# Patient Record
Sex: FEMALE | NOT HISPANIC OR LATINO | URBAN - METROPOLITAN AREA
[De-identification: names, ages, dates, MRNs, and addresses within clinical notes are randomized per-mention and may not be internally consistent; named-entity substitution may affect disease eponyms.]

---

## 2017-12-15 ENCOUNTER — OFFICE VISIT (OUTPATIENT)
Dept: FAMILY MEDICINE CLINIC | Facility: CLINIC | Age: 27
End: 2017-12-15

## 2017-12-15 VITALS
WEIGHT: 196.1 LBS | DIASTOLIC BLOOD PRESSURE: 70 MMHG | OXYGEN SATURATION: 100 % | TEMPERATURE: 98 F | RESPIRATION RATE: 18 BRPM | HEART RATE: 78 BPM | SYSTOLIC BLOOD PRESSURE: 118 MMHG

## 2017-12-15 DIAGNOSIS — R55 SYNCOPE, UNSPECIFIED SYNCOPE TYPE: ICD-10-CM

## 2017-12-15 DIAGNOSIS — H02.823 EYELID CYST, RIGHT: ICD-10-CM

## 2017-12-15 DIAGNOSIS — Z00.00 WELL ADULT EXAM: Primary | ICD-10-CM

## 2017-12-15 PROCEDURE — 99212 OFFICE O/P EST SF 10 MIN: CPT | Performed by: INTERNAL MEDICINE

## 2017-12-15 PROCEDURE — 71020 CHG CHEST X-RAY 2 VW: CPT | Performed by: INTERNAL MEDICINE

## 2017-12-15 PROCEDURE — 99385 PREV VISIT NEW AGE 18-39: CPT | Performed by: INTERNAL MEDICINE

## 2017-12-15 PROCEDURE — 93000 ELECTROCARDIOGRAM COMPLETE: CPT | Performed by: INTERNAL MEDICINE

## 2017-12-15 NOTE — PROGRESS NOTES
"Subjective   Marbella Montilla is a 27 y.o. female who comes in today for   Chief Complaint   Patient presents with   • Annual Exam   • NEW PATIENT   .    History of Present Illness   Here for CPE as a new patient.  Is a friend of Valeri Whitt.  Has a 10 year old son..  Is a  and travels a lot.  In 2015 she was working out with a  and had a 1 episode of syncope and another of near syncope both while exercising and performing a movement related to valsalva.  Was doing walking squats when she passed out first time.  Had done 40 squats and then got nauseated and light headed and stomach pain.  She sat down and put her her hands above head and then she passed out and awoke with him staring at her.  Had another similar episode was during a bootcamp and was burpees and felt similar sx's and she stopped herself and prevented a black out.  A few months ago while doing weights she felt lightheaded and nausea and stopped again and no black out.  She had drank and eaten that day.  No cp.  No palpitations.  Grandma has a pacemaker.  Mom has similar abnl rhythm but no pacemaker yet and has SLE.  Blood pressure always normal.  Walks about 4 miles/day and no sx's of syncope or presyncope.  No orthostatic sx's or dizziness.  Mom with SLE.  Has right upper eyelid cyst that fills with fluid and has drained at times.  No visual changes  The following portions of the patient's history were reviewed and updated as appropriate: allergies, current medications, past family history, past medical history, past social history, past surgical history and problem list.    Review of Systems   Constitutional: Negative.    HENT: Negative.         She snores at night and has noticed it for past few years   Eyes: Negative.         Right eyelid upper cyst on it that has been there for a  year   Respiratory:        For years she has had random breathing differences.  Mom called it her \"quirks\".  Thinks it is in her head.   "   Cardiovascular: Negative for chest pain, palpitations and leg swelling.   Gastrointestinal: Negative.  Negative for constipation.   Genitourinary:        Cycles are regular   Musculoskeletal: Negative.    Neurological: Positive for syncope.   Psychiatric/Behavioral:        Situational anxiety at times.       Vitals:    12/15/17 1211   BP: 118/70   Pulse: 78   Resp: 18   Temp: 98 °F (36.7 °C)   SpO2: 100%       Objective   Physical Exam   Constitutional: She is oriented to person, place, and time. She appears well-developed and well-nourished.   HENT:   Head: Normocephalic and atraumatic.   Right Ear: External ear normal.   Left Ear: External ear normal.   Mouth/Throat: Oropharynx is clear and moist.   Eyes: Conjunctivae and EOM are normal. Pupils are equal, round, and reactive to light.   Right upper lid with linear irregular cyst that is mostly flat and not inflamed   Neck: Neck supple. No thyromegaly present.   Cardiovascular: Normal rate, regular rhythm, normal heart sounds and intact distal pulses.  Exam reveals no gallop and no friction rub.    No murmur heard.  No murmur with squatting and standing   Pulmonary/Chest: Effort normal and breath sounds normal.   Abdominal: Soft. Bowel sounds are normal. She exhibits no distension and no mass. There is no tenderness.   Lymphadenopathy:     She has no cervical adenopathy.   Neurological: She is alert and oriented to person, place, and time. She has normal reflexes.   Skin: Skin is warm.   Psychiatric: She has a normal mood and affect. Her behavior is normal. Judgment and thought content normal.   Nursing note and vitals reviewed.      Assessment/Plan   Marbella was seen today for annual exam and new patient.    Diagnoses and all orders for this visit:    Well adult exam  -     ECG 12 Lead  -     CBC & Differential  -     Comprehensive Metabolic Panel  -     Lipid Panel With LDL / HDL Ratio  -     T3, Free  -     T4, Free  -     TSH  -     Urinalysis With / Culture  If Indicated - Urine, Clean Catch  -     Vitamin D 25 Hydroxy    Syncope, unspecified syncope type  -     Ambulatory Referral to Cardiology  -     CBC & Differential  -     Comprehensive Metabolic Panel  -     Lipid Panel With LDL / HDL Ratio  -     T3, Free  -     T4, Free  -     TSH  -     Urinalysis With / Culture If Indicated - Urine, Clean Catch  -     Vitamin D 25 Hydroxy    Other orders  -     SCANNED EKG    EKG is normal.  Concerned about hypertrophic cardiomyopathy or arrhythmia, however, her exam is totally normal.  No murmur with valsalva heard and EKG normal.  Carotid disease is another concern.  Will have her see cardiology as soon as possible and in the meantime no lifting weights or doing any kind of exercise with bearing down or holding breath.   Walking is fine.  Labs today.  Refer to Dr. Valdovinos for her eye lid cyst and removal.                 I have asked for the patient to return to clinic in 6month(s).

## 2017-12-15 NOTE — PROGRESS NOTES
Procedure     ECG 12 Lead  Date/Time: 12/15/2017 1:03 PM  Performed by: LUAN ORDAZ  Authorized by: LUAN ORDAZ   Comparison: not compared with previous ECG   Rhythm: sinus bradycardia  Rate: normal  Conduction: conduction normal  ST Segments: ST segments normal  T Waves: T waves normal  QRS axis: normal  Clinical impression: normal ECG

## 2017-12-16 LAB
25(OH)D3+25(OH)D2 SERPL-MCNC: 11.2 NG/ML (ref 30–100)
ALBUMIN SERPL-MCNC: 4.4 G/DL (ref 3.5–5.2)
ALBUMIN/GLOB SERPL: 1.4 G/DL
ALP SERPL-CCNC: 80 U/L (ref 39–117)
ALT SERPL-CCNC: 14 U/L (ref 1–33)
AST SERPL-CCNC: 16 U/L (ref 1–32)
BASOPHILS # BLD AUTO: 0.02 10*3/MM3 (ref 0–0.2)
BASOPHILS NFR BLD AUTO: 0.3 % (ref 0–1.5)
BILIRUB SERPL-MCNC: 0.4 MG/DL (ref 0.1–1.2)
BUN SERPL-MCNC: 9 MG/DL (ref 6–20)
BUN/CREAT SERPL: 12.2 (ref 7–25)
CALCIUM SERPL-MCNC: 9.4 MG/DL (ref 8.6–10.5)
CHLORIDE SERPL-SCNC: 102 MMOL/L (ref 98–107)
CHOLEST SERPL-MCNC: 174 MG/DL (ref 0–200)
CO2 SERPL-SCNC: 23.9 MMOL/L (ref 22–29)
CREAT SERPL-MCNC: 0.74 MG/DL (ref 0.57–1)
EOSINOPHIL # BLD AUTO: 0.13 10*3/MM3 (ref 0–0.7)
EOSINOPHIL NFR BLD AUTO: 2 % (ref 0.3–6.2)
ERYTHROCYTE [DISTWIDTH] IN BLOOD BY AUTOMATED COUNT: 13.6 % (ref 11.7–13)
GFR SERPLBLD CREATININE-BSD FMLA CKD-EPI: 113 ML/MIN/1.73
GFR SERPLBLD CREATININE-BSD FMLA CKD-EPI: 93 ML/MIN/1.73
GLOBULIN SER CALC-MCNC: 3.2 GM/DL
GLUCOSE SERPL-MCNC: 80 MG/DL (ref 65–99)
HCT VFR BLD AUTO: 41.2 % (ref 35.6–45.5)
HDLC SERPL-MCNC: 57 MG/DL (ref 40–60)
HGB BLD-MCNC: 13.2 G/DL (ref 11.9–15.5)
IMM GRANULOCYTES # BLD: 0 10*3/MM3 (ref 0–0.03)
IMM GRANULOCYTES NFR BLD: 0 % (ref 0–0.5)
LDLC SERPL CALC-MCNC: 100 MG/DL (ref 0–100)
LDLC/HDLC SERPL: 1.75 {RATIO}
LYMPHOCYTES # BLD AUTO: 2.6 10*3/MM3 (ref 0.9–4.8)
LYMPHOCYTES NFR BLD AUTO: 40.3 % (ref 19.6–45.3)
MCH RBC QN AUTO: 29.7 PG (ref 26.9–32)
MCHC RBC AUTO-ENTMCNC: 32 G/DL (ref 32.4–36.3)
MCV RBC AUTO: 92.6 FL (ref 80.5–98.2)
MONOCYTES # BLD AUTO: 0.54 10*3/MM3 (ref 0.2–1.2)
MONOCYTES NFR BLD AUTO: 8.4 % (ref 5–12)
NEUTROPHILS # BLD AUTO: 3.16 10*3/MM3 (ref 1.9–8.1)
NEUTROPHILS NFR BLD AUTO: 49 % (ref 42.7–76)
PLATELET # BLD AUTO: 365 10*3/MM3 (ref 140–500)
POTASSIUM SERPL-SCNC: 3.9 MMOL/L (ref 3.5–5.2)
PROT SERPL-MCNC: 7.6 G/DL (ref 6–8.5)
RBC # BLD AUTO: 4.45 10*6/MM3 (ref 3.9–5.2)
SODIUM SERPL-SCNC: 141 MMOL/L (ref 136–145)
T3FREE SERPL-MCNC: 4.4 PG/ML (ref 2–4.4)
T4 FREE SERPL-MCNC: 1.12 NG/DL (ref 0.93–1.7)
TRIGL SERPL-MCNC: 86 MG/DL (ref 0–150)
TSH SERPL DL<=0.005 MIU/L-ACNC: 0.7 MIU/ML (ref 0.27–4.2)
VLDLC SERPL CALC-MCNC: 17.2 MG/DL (ref 5–40)
WBC # BLD AUTO: 6.45 10*3/MM3 (ref 4.5–10.7)

## 2017-12-17 PROBLEM — H02.823 EYELID CYST, RIGHT: Status: ACTIVE | Noted: 2017-12-17

## 2017-12-19 RX ORDER — ERGOCALCIFEROL 1.25 MG/1
50000 CAPSULE ORAL
Qty: 30 CAPSULE | Refills: 0 | Status: SHIPPED | OUTPATIENT
Start: 2017-12-19 | End: 2018-03-14

## 2018-03-14 ENCOUNTER — OFFICE VISIT (OUTPATIENT)
Dept: CARDIOLOGY | Facility: CLINIC | Age: 28
End: 2018-03-14

## 2018-03-14 VITALS
HEIGHT: 66 IN | BODY MASS INDEX: 30.6 KG/M2 | HEART RATE: 70 BPM | DIASTOLIC BLOOD PRESSURE: 78 MMHG | WEIGHT: 190.4 LBS | SYSTOLIC BLOOD PRESSURE: 110 MMHG | RESPIRATION RATE: 16 BRPM

## 2018-03-14 DIAGNOSIS — R55 SYNCOPE, UNSPECIFIED SYNCOPE TYPE: Primary | ICD-10-CM

## 2018-03-14 PROCEDURE — 93000 ELECTROCARDIOGRAM COMPLETE: CPT | Performed by: INTERNAL MEDICINE

## 2018-03-14 PROCEDURE — 99204 OFFICE O/P NEW MOD 45 MIN: CPT | Performed by: INTERNAL MEDICINE

## 2018-03-14 NOTE — PROGRESS NOTES
PATIENTINFORMATION    Date of Office Visit: 2018  Encounter Provider: Lida Meza MD  Place of Service: [facility] CARDIOLOGY  Patient Name: Marbella Montilla  : 1990    Subjective:     Encounter Date:2018      Patient ID: Marbella Montilla is a 28 y.o. female.      History of Present Illness    This is a woman with no significant past medical history. She was working out with a  about a year ago. She thinks she was doing lunges. She started to feel nauseous, lightheaded and like she was going to pass out. She kept going and then woke up on the floor. Her  reported she was unconscious for a few seconds. She said she felt better when she woke up. The symptoms she was having prior to passing out were gone. There was no postictal confusion. She stopped working out and then recently went to the gym with a friend and was doing some arm exercises when she started having the same symptoms. This time she stopped and rested, and the symptoms passed without a loss of consciousness. She was telling this to Dr. Baird who referred her here for more evaluation. Outside of these 2 episodes, she says she has never had any syncopal spells.      Review of Systems   Constitution: Positive for weight loss. Negative for fever, malaise/fatigue and weight gain.   HENT: Negative for ear pain, hearing loss, nosebleeds and sore throat.    Eyes: Negative for double vision, pain, vision loss in left eye and vision loss in right eye.   Cardiovascular:        See history of present illness.   Respiratory: Positive for snoring. Negative for cough, shortness of breath, sleep disturbances due to breathing and wheezing.    Endocrine: Negative for cold intolerance, heat intolerance and polyuria.   Skin: Negative for itching, poor wound healing and rash.   Musculoskeletal: Negative for joint pain, joint swelling and myalgias.   Gastrointestinal: Negative for abdominal pain,  "diarrhea, hematochezia, nausea and vomiting.   Genitourinary: Negative for hematuria and hesitancy.   Neurological: Negative for numbness, paresthesias and seizures.   Psychiatric/Behavioral: Negative for depression. The patient is not nervous/anxious.            ECG 12 Lead  Date/Time: 3/14/2018 1:35 PM  Performed by: DENISE LONG  Authorized by: DENISE LONG   Comparison: compared with previous ECG from 12/15/2017  Similar to previous ECG  Rhythm: sinus rhythm  BPM: 70  Conduction: conduction normal  ST Segments: ST segments normal  T Waves: T waves normal  Clinical impression: normal ECG               Objective:     /78 (BP Location: Right arm, Patient Position: Sitting, Cuff Size: Adult)   Pulse 70   Resp 16   Ht 167.6 cm (66\")   Wt 86.4 kg (190 lb 6.4 oz)   BMI 30.73 kg/m²  Body mass index is 30.73 kg/m².     Physical Exam   Constitutional: She is oriented to person, place, and time. She appears well-developed. She does not appear ill.   HENT:   Head: Normocephalic and atraumatic. Head is without contusion.   Right Ear: Hearing normal. No drainage.   Left Ear: Hearing normal. No drainage.   Nose: No nasal deformity. No epistaxis.   Eyes: Lids are normal. Pupils are equal, round, and reactive to light. Right eye exhibits no exudate. Left eye exhibits no exudate. Left conjunctiva has no hemorrhage.   Neck: No JVD present. Carotid bruit is not present. No tracheal deviation present. No thyroid mass and no thyromegaly present.   Cardiovascular: Normal rate, regular rhythm and normal heart sounds.    Pulses:       Posterior tibial pulses are 2+ on the right side, and 2+ on the left side.   Pulmonary/Chest: Effort normal and breath sounds normal.   Abdominal: Soft. Normal appearance and bowel sounds are normal. There is no tenderness.   Musculoskeletal: Normal range of motion.        Right shoulder: She exhibits no deformity.        Left shoulder: She exhibits no deformity.   Neurological: She is " alert and oriented to person, place, and time. She has normal strength.   Skin: Skin is warm, dry and intact. No rash noted.   Psychiatric: She has a normal mood and affect. Her behavior is normal. Thought content normal.   Vitals reviewed.            Assessment/Plan:       I am concerned that she has had syncope with exertion and presyncope with exertion. I am going to check an echocardiogram. I am also going to have her do a treadmill EKG for further evaluation. I will go over the results of those tests when they are available. In the meantime, I told her not to exercise.      Orders Placed This Encounter   Procedures   • Treadmill Stress Test     Standing Status:   Future     Standing Expiration Date:   3/14/2019     Order Specific Question:   Reason for exam?     Answer:   Other     Order Specific Question:   Reason for Exam:     Answer:   syncope with exertion   • ECG 12 Lead     This order was created via procedure documentation   • Adult Transthoracic Echo Complete W/ Cont if Necessary Per Protocol     Standing Status:   Future     Standing Expiration Date:   3/14/2019     Order Specific Question:   Reason for exam?     Answer:   Syncope     There are no discharge medications for this patient.              Lida Meza MD  03/14/18  1:36 PM

## 2018-03-27 ENCOUNTER — APPOINTMENT (OUTPATIENT)
Dept: CARDIOLOGY | Facility: HOSPITAL | Age: 28
End: 2018-03-27
Attending: INTERNAL MEDICINE

## 2021-04-16 ENCOUNTER — BULK ORDERING (OUTPATIENT)
Dept: CASE MANAGEMENT | Facility: OTHER | Age: 31
End: 2021-04-16

## 2021-04-16 DIAGNOSIS — Z23 IMMUNIZATION DUE: ICD-10-CM
